# Patient Record
Sex: MALE | Race: WHITE | NOT HISPANIC OR LATINO | Employment: UNEMPLOYED | URBAN - METROPOLITAN AREA
[De-identification: names, ages, dates, MRNs, and addresses within clinical notes are randomized per-mention and may not be internally consistent; named-entity substitution may affect disease eponyms.]

---
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
## 2022-07-11 ENCOUNTER — OFFICE VISIT (OUTPATIENT)
Dept: FAMILY MEDICINE CLINIC | Facility: CLINIC | Age: 22
End: 2022-07-11

## 2022-07-11 VITALS
WEIGHT: 276 LBS | HEIGHT: 72 IN | OXYGEN SATURATION: 97 % | HEART RATE: 67 BPM | DIASTOLIC BLOOD PRESSURE: 88 MMHG | BODY MASS INDEX: 37.38 KG/M2 | SYSTOLIC BLOOD PRESSURE: 120 MMHG

## 2022-07-11 DIAGNOSIS — Z13.29 THYROID DISORDER SCREENING: ICD-10-CM

## 2022-07-11 DIAGNOSIS — R03.0 ELEVATED BLOOD PRESSURE READING WITHOUT DIAGNOSIS OF HYPERTENSION: Primary | ICD-10-CM

## 2022-07-11 DIAGNOSIS — Z13.1 DIABETES MELLITUS SCREENING: ICD-10-CM

## 2022-07-11 DIAGNOSIS — Z13.220 LIPID SCREENING: ICD-10-CM

## 2022-07-11 DIAGNOSIS — M79.641 RIGHT HAND PAIN: ICD-10-CM

## 2022-07-11 PROCEDURE — 99204 OFFICE O/P NEW MOD 45 MIN: CPT | Performed by: PHYSICIAN ASSISTANT

## 2022-07-11 NOTE — PROGRESS NOTES
"Chief Complaint  Annual Exam and Elevated Blood Pressure    Subjective          Abdoul Alvarenga presents to [facility]  Patient in today to discuss bp . He states on occasion has noticed his blood pressure has been elevated when he goes to donate plasma.  Denies any chest pain or shortness of breath.  He states in past was able to lose weight with diet and exercise, but has slowly put the weight back on.  Wants to return to clinic for fasting labs. Also states >2 months ago went bowling and after first time, he felt pain to base of 4th finger right hand that with movt causes pain into hand and forearm. No swelling/redness noted. Discomfort has persisted.       Objective   Vital Signs:   /88   Pulse 67   Ht 182.9 cm (72\")   Wt 125 kg (276 lb)   SpO2 97%   BMI 37.43 kg/m²     Body mass index is 37.43 kg/m².    Review of Systems   Constitutional: Negative for fatigue.   HENT: Negative for congestion and sore throat.    Gastrointestinal: Negative for abdominal pain, diarrhea, nausea and vomiting.   Genitourinary: Negative for dysuria and frequency.   Neurological: Negative for dizziness and headache.       Past History:  Medical History: has no past medical history on file.   Surgical History: has no past surgical history on file.   Family History: family history includes Hypertension in his father.   Social History: reports that he has never smoked. He has never used smokeless tobacco. He reports that he does not drink alcohol.    No current outpatient medications on file.  Allergies: Patient has no known allergies.    Physical Exam  Constitutional:       Appearance: Normal appearance.   HENT:      Right Ear: Tympanic membrane normal.      Left Ear: Tympanic membrane normal.      Mouth/Throat:      Pharynx: Oropharynx is clear.   Eyes:      Conjunctiva/sclera: Conjunctivae normal.      Pupils: Pupils are equal, round, and reactive to light.   Cardiovascular:      Rate and " Rhythm: Normal rate and regular rhythm.      Heart sounds: Normal heart sounds.   Pulmonary:      Effort: Pulmonary effort is normal.      Breath sounds: Normal breath sounds.   Abdominal:      Palpations: Abdomen is soft.      Tenderness: There is no abdominal tenderness.   Musculoskeletal:      Comments: FROM of R hand;   Tenderness noted to right  hand/forearm with  w/ flexion of 4th finger right hand; no redness /swelling noted   Neurological:      Mental Status: He is oriented to person, place, and time.   Psychiatric:         Mood and Affect: Mood normal.         Behavior: Behavior normal.             Assessment and Plan   Diagnoses and all orders for this visit:    1. Elevated blood pressure reading without diagnosis of hypertension (Primary)  -     CBC & Differential; Future  -     Comprehensive Metabolic Panel; Future  Encouraged healthy diet and exercise. Will rtc for fasting labs. Recommend to keep bp log over next  2 weeks and bring in, prior as needed.   2. Lipid screening  -     Lipid Panel; Future  Will check fasting labs.   3. Thyroid disorder screening  -     TSH; Future  Will check TSH.   4. Diabetes mellitus screening  -     Hemoglobin A1c; Future  Hga1c with labs.     5. Right hand pain  With persistent discomfort, recommend ortho evaluation- pt states will schedule own appt.   Follow Up   No follow-ups on file.  Patient was given instructions and counseling regarding his condition or for health maintenance advice. Please see specific information pulled into the AVS if appropriate.     Marilu Cosby PA-C

## 2022-10-05 ENCOUNTER — TELEPHONE (OUTPATIENT)
Dept: FAMILY MEDICINE CLINIC | Facility: CLINIC | Age: 22
End: 2022-10-05